# Patient Record
Sex: MALE | Race: WHITE | Employment: FULL TIME | URBAN - METROPOLITAN AREA
[De-identification: names, ages, dates, MRNs, and addresses within clinical notes are randomized per-mention and may not be internally consistent; named-entity substitution may affect disease eponyms.]

---

## 2017-02-27 ENCOUNTER — OFFICE VISIT (OUTPATIENT)
Dept: URGENT CARE | Facility: RETAIL CLINIC | Age: 37
End: 2017-02-27
Payer: COMMERCIAL

## 2017-02-27 VITALS
TEMPERATURE: 99.4 F | DIASTOLIC BLOOD PRESSURE: 81 MMHG | SYSTOLIC BLOOD PRESSURE: 132 MMHG | HEART RATE: 92 BPM | OXYGEN SATURATION: 98 %

## 2017-02-27 DIAGNOSIS — R09.81 NASAL SINUS CONGESTION: ICD-10-CM

## 2017-02-27 DIAGNOSIS — J01.90 ACUTE SINUSITIS WITH SYMPTOMS > 10 DAYS: Primary | ICD-10-CM

## 2017-02-27 DIAGNOSIS — H10.31 ACUTE CONJUNCTIVITIS OF RIGHT EYE, UNSPECIFIED ACUTE CONJUNCTIVITIS TYPE: ICD-10-CM

## 2017-02-27 PROCEDURE — 99213 OFFICE O/P EST LOW 20 MIN: CPT | Performed by: PHYSICIAN ASSISTANT

## 2017-02-27 RX ORDER — OFLOXACIN 3 MG/ML
1 SOLUTION/ DROPS OPHTHALMIC 4 TIMES DAILY
Qty: 1 BOTTLE | Refills: 0 | Status: SHIPPED | OUTPATIENT
Start: 2017-02-27 | End: 2019-03-05

## 2017-02-27 RX ORDER — FLUTICASONE PROPIONATE 50 MCG
1-2 SPRAY, SUSPENSION (ML) NASAL DAILY
Qty: 1 BOTTLE | Refills: 1 | Status: SHIPPED | OUTPATIENT
Start: 2017-02-27

## 2017-02-27 NOTE — PROGRESS NOTES
S: Pt present to [facility] concerned of eye problem.  Possible pink eye ? - rt eye a little watery and mattery this am  Eye pink x few days  Some mattery discharge rt eye today  No  history of trauma  No  FB sensation   No  Light sensitivity   No  vision changes  Wears glasses  No  contacts    Remainder of ROS :  Nasal and sinus congestion x 2 weeks - getting worse.  Tired  Afebrile  Minimal cough - worse at night   Colored nasal discharge   < sense taste and smell    Last antibiotic 2015      No past medical history on file.  Past Surgical History   Procedure Laterality Date     No history of surgery       Patient Active Problem List   Diagnosis     Irritable bowel syndrome     Other diseases of nasal cavity and sinuses     Other hammer toe (acquired)     Dysfunction of eustachian tube     CARDIOVASCULAR SCREENING; LDL GOAL LESS THAN 160     Current Outpatient Prescriptions   Medication     triamcinolone (NASACORT ALLERGY 24HR) 55 MCG/ACT nasal inhaler     Pseudoephedrine HCl (SUDAFED PO)     No current facility-administered medications for this visit.          O: /81  Pulse 92  Temp 99.4  F (37.4  C) (Oral)  SpO2 98%    Eyes:   Fundi benign dre  PERRLA, EOM bilaterally normal.  Conjunctival injection noted right.  Faint diffuse scleral injection rt but no circumcorneal injection.  No FB seen   Mattery discharge noted  -none    External ears  and canals clear bilaterally. TM's  normal bilaterally. Nose marked congestion with thick discharge. Oropharynx  normal. Neck supple without palpable adenopathy. Lungs -clear. Some tenderness to palpate over frontal and max sinus areas.    A;    Nasal sinus congestion  Acute sinusitis with symptoms > 10 days  Acute conjunctivitis of right eye, unspecified acute conjunctivitis type      P: Prescriptions as below. Discussed indications, dosing, side affects and adverse reactions of medications with  Patient - Augmentin and Flonase (or Nasacort). Fill rx of  ocuflux if > s/s bacterial conjunctivitis. Ok to use moisurting/lubricating eyedrops for now.  > fluids  > rest  Discussed decongestants and expectorants.  Contagiousness and hygiene discussed.    Follow up with your primary care provider if worsening symptoms, not improving or if symptoms do not resolve.  [facility]  [phone]    Declined note for work    Pt is comfortable with this plan.  Electronically signed,  BOB Nelson, PAC

## 2017-02-27 NOTE — MR AVS SNAPSHOT
"              After Visit Summary   2017    Jordan Dickson    MRN: [id number]           Patient Information     Date Of Birth          1980        Visit Information        Provider Department      2017 9:30 AM Alexsandra Nelson PA-C [facility]        Today's Diagnoses     Acute sinusitis with symptoms > 10 days    -  1    Nasal sinus congestion        Acute conjunctivitis of right eye, unspecified acute conjunctivitis type           Follow-ups after your visit        Who to contact     You can reach your care team any time of the day by calling [phone].  Notification of test results:  If you have an abnormal lab result, we will notify you by phone as soon as possible.         Additional Information About Your Visit        MyChart Information     [employer] lets you send messages to your doctor, view your test results, renew your prescriptions, schedule appointments and more. To sign up, go to [email or web address] . Click on \"Log in\" on the left side of the screen, which will take you to the Welcome page. Then click on \"Sign up Now\" on the right side of the page.     You will be asked to enter the access code listed below, as well as some personal information. Please follow the directions to create your username and password.     Your access code is: [email or web address]  Expires: 2017  9:51 AM     Your access code will  in 90 days. If you need help or a new code, please call your [place] clinic or [phone].        Care EveryWhere ID     This is your Care EveryWhere ID. This could be used by other organizations to access your [place] medical records  [id number]        Your Vitals Were     Pulse Temperature Pulse Oximetry             92 99.4  F (37.4  C) (Oral) 98%          Blood Pressure from Last 3 Encounters:   17 132/81   02/20/15 142/80   14 135/90    Weight from Last 3 Encounters:   02/20/15 222 lb (100.7 kg)   11 210 lb 14.4 oz (95.7 kg) "   04/08/09 194 lb 14.4 oz (88.4 kg)              Today, you had the following     No orders found for display         Today's Medication Changes          These changes are accurate as of: 2/27/17  9:51 AM.  If you have any questions, ask your nurse or doctor.               Start taking these medicines.        Dose/Directions    amoxicillin-clavulanate 875-125 MG per tablet   Commonly known as:  AUGMENTIN   Used for:  Acute sinusitis with symptoms > 10 days   Started by:  Alexsandra Nelson PA-C        Dose:  1 tablet   Take 1 tablet by mouth 2 times daily   Quantity:  20 tablet   Refills:  0       fluticasone 50 MCG/ACT spray   Commonly known as:  FLONASE   Used for:  Nasal sinus congestion   Started by:  Alexsandra Nelson PA-C        Dose:  1-2 spray   Spray 1-2 sprays into both nostrils daily   Quantity:  1 Bottle   Refills:  1       ofloxacin 0.3 % ophthalmic solution   Commonly known as:  OCUFLOX   Used for:  Acute conjunctivitis of right eye, unspecified acute conjunctivitis type   Started by:  Alexsandra Nelson PA-C        Dose:  1 drop   Apply 1 drop to eye 4 times daily   Quantity:  1 Bottle   Refills:  0            Where to get your medicines      These medications were sent to [address] [place] - [address]  [address] [place] [address]     Phone:  [phone]     amoxicillin-clavulanate 875-125 MG per tablet    fluticasone 50 MCG/ACT spray         Some of these will need a paper prescription and others can be bought over the counter.  Ask your nurse if you have questions.     Bring a paper prescription for each of these medications     ofloxacin 0.3 % ophthalmic solution                Primary Care Provider Office Phone # Fax #    See Olson [phone]       XXX RESIGNED  [address] [place] [address]        Thank you!     Thank you for choosing [facility] [place]  for your care. Our goal is always to provide you with  excellent care. Hearing back from our patients is one way we can continue to improve our services. Please take a few minutes to complete the written survey that you may receive in the mail after your visit with us. Thank you!             Your Updated Medication List - Protect others around you: Learn how to safely use, store and throw away your medicines at www.disposemymeds.org.          This list is accurate as of: 2/27/17  9:51 AM.  Always use your most recent med list.                   Brand Name Dispense Instructions for use    amoxicillin-clavulanate 875-125 MG per tablet    AUGMENTIN    20 tablet    Take 1 tablet by mouth 2 times daily       fluticasone 50 MCG/ACT spray    FLONASE    1 Bottle    Spray 1-2 sprays into both nostrils daily       NASACORT ALLERGY 24HR 55 MCG/ACT Inhaler   Generic drug:  triamcinolone      Spray 2 sprays into both nostrils daily Reported on 2/27/2017       ofloxacin 0.3 % ophthalmic solution    OCUFLOX    1 Bottle    Apply 1 drop to eye 4 times daily       SUDAFED PO      Take by mouth every 6 hours as needed for congestion Reported on 2/27/2017

## 2017-02-27 NOTE — NURSING NOTE
"Chief Complaint   Patient presents with     Eye Problem     right eye watery and draining     Sinus Problem     x 2 weeks       Initial /81  Pulse 92  Temp 99.4  F (37.4  C) (Oral)  SpO2 98% Estimated body mass index is 28.5 kg/(m^2) as calculated from the following:    Height as of 4/8/09: 6' 2\" (1.88 m).    Weight as of 2/20/15: 222 lb (100.7 kg).  Medication Reconciliation: complete.  Kalani Armstrong      "

## 2019-03-05 ENCOUNTER — OFFICE VISIT (OUTPATIENT)
Dept: URGENT CARE | Facility: RETAIL CLINIC | Age: 39
End: 2019-03-05
Payer: COMMERCIAL

## 2019-03-05 VITALS — DIASTOLIC BLOOD PRESSURE: 86 MMHG | HEART RATE: 76 BPM | SYSTOLIC BLOOD PRESSURE: 135 MMHG | TEMPERATURE: 98.2 F

## 2019-03-05 DIAGNOSIS — B02.9 HERPES ZOSTER WITHOUT COMPLICATION: Primary | ICD-10-CM

## 2019-03-05 PROCEDURE — 99213 OFFICE O/P EST LOW 20 MIN: CPT | Performed by: PHYSICIAN ASSISTANT

## 2019-03-05 RX ORDER — VALACYCLOVIR HYDROCHLORIDE 1 G/1
1000 TABLET, FILM COATED ORAL 3 TIMES DAILY
Qty: 21 TABLET | Refills: 0 | Status: SHIPPED | OUTPATIENT
Start: 2019-03-05 | End: 2019-12-27

## 2019-03-05 NOTE — PATIENT INSTRUCTIONS
Valacyclovir 1000mg as directed, three times a day for 7 days.  This is from a reactivation of the chicken pox virus.  Keep covered to avoid spread especially around pregnant women and infants.  Avoid direct contact with others- can be spread through direct contact with fluid in blisters.  Avoid touching affected area; Wash hands frequently.  May apply calamine as needed for itch relief.  May take up to 4000 mg of Tylenol daily- 1000 mg 4 times daily.  May take up to 800 mg ibuprofen every 8 hours.  Alternate Tylenol and Ibuprofen every 4 hours.  Follow up with primary care provider as needed if symptoms fail to resolve or worsen.

## 2019-03-05 NOTE — PROGRESS NOTES
Chief Complaint   Patient presents with     Derm Problem     rash on left arm x 2 days, now rash spread to left hand, started out with muscle pain on upper part of left arm, no fevers, rash does not itch      SUBJECTIVE:  Jordan Dickson is a 39 year old male who presents to the clinic today for a rash.  Onset of rash was 2 days ago.   Rash is sudden onset.   Location of the rash: left arm  Quality/symptoms of rash: discomfort if something rubs against it, burning sensation  Associated symptoms include: some muscle tenderness. He also recently raked snow off of his mom's roof.  Symptoms are moderate and rash seems to be worsening.  Previous history of a similar rash? No  Treatment measures tried include:  none  Recent exposure history: none known  Patient denies new meds, pets, foods, soaps, detergents, lotions, or enviornmental contacts.    No past medical history on file.  Current Outpatient Medications   Medication Sig Dispense Refill     DiphenhydrAMINE HCl (BENADRYL PO)        valACYclovir (VALTREX) 1000 mg tablet Take 1 tablet (1,000 mg) by mouth 3 times daily for 7 days 21 tablet 0     fluticasone (FLONASE) 50 MCG/ACT spray Spray 1-2 sprays into both nostrils daily (Patient not taking: Reported on 3/5/2019) 1 Bottle 1     Pseudoephedrine HCl (SUDAFED PO) Take by mouth every 6 hours as needed for congestion Reported on 2/27/2017       triamcinolone (NASACORT ALLERGY 24HR) 55 MCG/ACT nasal inhaler Spray 2 sprays into both nostrils daily Reported on 2/27/2017       Social History     Tobacco Use     Smoking status: Never Smoker     Smokeless tobacco: Never Used   Substance Use Topics     Alcohol use: No     Allergies   Allergen Reactions     No Known Drug Allergies      REVIEW OF SYSTEMS  General: NEGATIVE for fever, chills.  Skin: POSITIVE for tender rash on left arm. NEGATIVE for itching, bruising.    EXAM:   /86 (BP Location: Right arm)   Pulse 76   Temp 98.2  F (36.8  C) (Tympanic)   GENERAL APPEARANCE:  healthy, alert and no distress  RESP: lungs clear to auscultation - no rales, rhonchi or wheezes  CV: regular rates and rhythm, normal S1 S2, no murmur noted  SKIN: Many clusters of vesicles on an erythematous base on left arm along C7 dermatome. The affected area spans ffrom shoulder to hand, over 50% of the C7 dermatome is affected. He has 1 spot on the dorsum of his hand ovet the first metacarpal bone, 3 spots on the palmar surface over thenar muscle and at the base of the index finger.    ASSESSMENT:    ICD-10-CM    1. Herpes zoster without complication B02.9 valACYclovir (VALTREX) 1000 mg tablet     PLAN:  Patient Instructions   Valacyclovir 1000mg as directed, three times a day for 7 days.  This is from a reactivation of the chicken pox virus.  Keep covered to avoid spread especially around pregnant women and infants.  Avoid direct contact with others- can be spread through direct contact with fluid in blisters.  Avoid touching affected area; Wash hands frequently.  May apply calamine as needed for itch relief.  May take up to 4000 mg of Tylenol daily- 1000 mg 4 times daily.  May take up to 800 mg ibuprofen every 8 hours.  Alternate Tylenol and Ibuprofen every 4 hours.  Follow up with primary care provider as needed if symptoms fail to resolve or worsen.      Follow up with primary care provider with any problems, questions or concerns or if symptoms worsen or fail to improve. Patient agreed to plan and verbalized understanding.    Nava Arevalo PA-C  [facility] [place]

## 2019-12-27 ENCOUNTER — OFFICE VISIT (OUTPATIENT)
Dept: URGENT CARE | Facility: RETAIL CLINIC | Age: 39
End: 2019-12-27
Payer: COMMERCIAL

## 2019-12-27 VITALS
HEART RATE: 69 BPM | OXYGEN SATURATION: 99 % | TEMPERATURE: 97.4 F | DIASTOLIC BLOOD PRESSURE: 90 MMHG | SYSTOLIC BLOOD PRESSURE: 146 MMHG

## 2019-12-27 DIAGNOSIS — B02.9 HERPES ZOSTER WITHOUT COMPLICATION: Primary | ICD-10-CM

## 2019-12-27 PROCEDURE — 99213 OFFICE O/P EST LOW 20 MIN: CPT | Performed by: INTERNAL MEDICINE

## 2019-12-27 RX ORDER — CEPHALEXIN 500 MG/1
500 CAPSULE ORAL 3 TIMES DAILY
Qty: 30 CAPSULE | Refills: 0 | Status: SHIPPED | OUTPATIENT
Start: 2019-12-27 | End: 2020-01-06

## 2019-12-27 RX ORDER — VALACYCLOVIR HYDROCHLORIDE 1 G/1
1000 TABLET, FILM COATED ORAL 2 TIMES DAILY
Qty: 20 TABLET | Refills: 0 | Status: SHIPPED | OUTPATIENT
Start: 2019-12-27 | End: 2020-01-06

## 2019-12-27 NOTE — PROGRESS NOTES
[facility] Care Progress Note        Lizzy Blount MD, MPH  12/27/2019    Jordan Dickson is a pleasant 39 year old male seen for a non-pruritic rash involving left forearm for 2 days . There has not been any change in the diet or new detergent, soap or toiletries.  No new medications, no insect bite. No fever or chills and no recent illness. No cough or shortness of breath or wheezing is referred.  No nausea, vomiting or diarrhea.  No arthralgia or myalgia.  No tongue, lip or mouth swelling or swallowing problems are referred.    Physical Exam   BP (!) 146/90   Pulse 69   Temp 97.4  F (36.3  C) (Tympanic)   SpO2 99%      Constitutional: Patient is in no distress The patient is pleasant and cooperative.   HEENT: Head:  Head is atraumatic, normocephalic.    Eyes: Pupils are equal, round and reactive to light and accomodation.  Sclera is non-icteric. No conjunctival injection, or exudate noted. Extraocular motion is intact. Visual acuity is intact bilaterally.  Ears:  External acoustic canals are patent and clear.  There is no erythema and bulging( exudate)  of the ( R/L ) tympanic membrane(s ).   Nose:  No Nasal congestion w/o drainage or mucosal ulceration is noted.  Throat:  Oral mucosa is moist.  No oral lesions are noted.  No posterior pharyngeal hyperemia or exudate noted.     Neck Supple.  There is no cervical lymphadenopathy.  No nuchal rigidity noted.  There is no meningismus.     Cardiovascular: Heart is regular to rate and rhythm.  No murmur is noted.     Chest. Chest Symmetrical, no soft tissues, swelling, or tenderness upon palpation   Lungs: Clear in the anterior and posterior pulmonary fields.   Abdomen: Soft and non-tender.    Back No flank tenderness is noted.   Extremeties No edema, no calf tenderness.   Neuro: No focal deficit.   Skin No petechiae or purpura is noted.  There are small vesicular eruption with a  dermatomal distribution pattern involving left forearm pronator surface  with Perifollicular and erythematous skin lesions. No pustules or exudative or crusty skin lesions noted.       Mood Normal         Assessment & Plan      Herpes zoster Left forearm:    - valACYclovir (VALTREX) 1000 mg tablet; Take 1 tablet (1,000 mg) by mouth 2 times daily for 10 days  Dispense: 20 tablet; Refill: 0  - cephALEXin (KEFLEX) 500 MG capsule; Take 1 capsule (500 mg) by mouth 3 times daily for 10 days  Dispense: 30 capsule; Refill: 0     Follow-up with your PCP in 4-5 days, earlier if symptoms worsen.  Please avoid direct skin contact with and infants,elderly and immunocompromised individuals.  Please keep the involved skin area covered withj clothing/dressing ( avoid adhesive taping,however).  Acetaminophen 650 mg or Ibuprofen 400 mg by mouth every 6 hours as needed.  Please wash the skin with soap and water daily.